# Patient Record
Sex: FEMALE | Race: WHITE | NOT HISPANIC OR LATINO | Employment: OTHER | URBAN - METROPOLITAN AREA
[De-identification: names, ages, dates, MRNs, and addresses within clinical notes are randomized per-mention and may not be internally consistent; named-entity substitution may affect disease eponyms.]

---

## 2024-03-04 DIAGNOSIS — M51.36 DDD (DEGENERATIVE DISC DISEASE), LUMBAR: Primary | ICD-10-CM

## 2024-03-15 ENCOUNTER — OFFICE VISIT (OUTPATIENT)
Dept: NEUROSURGERY | Facility: CLINIC | Age: 64
End: 2024-03-15
Payer: MEDICARE

## 2024-03-15 VITALS — BODY MASS INDEX: 21.34 KG/M2 | TEMPERATURE: 97.8 F | HEIGHT: 61 IN | WEIGHT: 113 LBS

## 2024-03-15 DIAGNOSIS — M79.604 RIGHT LEG PAIN: Primary | ICD-10-CM

## 2024-03-15 RX ORDER — LORATADINE 10 MG/1
10 TABLET ORAL DAILY
COMMUNITY

## 2024-03-15 NOTE — PROGRESS NOTES
Patient: Yecenia Campos  : 1960    Primary Care Provider: Jayleen Boykin DO    Requesting Provider: As above      Chief Complaint: Back Pain (BILATERAL), Leg Pain (RLE>LLE, BOTTOM OF RIGHT FOOT), and Numbness (LLE THIGH - CHRONIC SINCE PREVIOUS LUMBAR SURGERY)      History of Present Illness: This is a 63 y.o. female who has previously undergone 2 lumbar discectomies.  The last of which was in  where she had a far lateral discectomy at L4-5 by Dr. Thorpe.  She has done well from that surgery, but did have some residual numbness and tingling in the left leg for which she is managed conservatively.  The patient had 2 subsequent falls 1 month ago.  After the first fall, she had significant pain radiating down the left leg.  She described it as tingling and burning in nature.  It was on the lateral aspect of her leg.  This has slowly improved over time.  She had a second fall a few days later and that resulted in pain of the right leg which was similar in nature.  She states the right leg pain has slightly improved, but is still present.  It is exacerbated by standing and walking.  It tends to radiate down the lateral aspect of her right leg.  She has not had any physical therapy or injections in her back recently.    PMHX  Allergies:  Allergies   Allergen Reactions    Codeine     Daliresp [Roflumilast] Other (See Comments)     OTHER      Dust Mite Extract     Isoflavones (Soy)     Molds & Smuts     Sulfa Antibiotics     Tree Extract      Medications    Current Outpatient Medications:     ADVAIR DISKUS 500-50 MCG/DOSE DISKUS, , Disp: , Rfl:     estradiol (CLIMARA) 0.1 MG/24HR patch, , Disp: , Rfl:     loratadine (Claritin) 10 MG tablet, Take 1 tablet by mouth Daily., Disp: , Rfl:     montelukast (SINGULAIR) 10 MG tablet, , Disp: , Rfl:     omeprazole (PriLOSEC) 40 MG capsule, , Disp: , Rfl:     PROAIR  (90 BASE) MCG/ACT inhaler, , Disp: , Rfl:   Past Medical History:  Past Medical History:    Diagnosis Date    Asthma     COPD (chronic obstructive pulmonary disease)     Decreased lung capacity     Pneumonia      Past Surgical History:  Past Surgical History:   Procedure Laterality Date    BACK SURGERY      BREAST SURGERY      CHOLECYSTECTOMY      HAND SURGERY      HYSTERECTOMY      KNEE ARTHROSCOPY       Social Hx:  Social History     Tobacco Use    Smoking status: Former    Smokeless tobacco: Never   Substance Use Topics    Alcohol use: No    Drug use: No     Family Hx:  Family History   Problem Relation Age of Onset    Diabetes Other     Cancer Other     Heart disease Other      Review of Systems:        Review of Systems   Constitutional:  Negative for activity change, appetite change, chills, diaphoresis, fatigue, fever and unexpected weight change.   HENT:  Negative for congestion, dental problem, drooling, ear discharge, ear pain, facial swelling, hearing loss, mouth sores, nosebleeds, postnasal drip, rhinorrhea, sinus pressure, sinus pain, sneezing, sore throat, tinnitus, trouble swallowing and voice change.    Eyes:  Negative for photophobia, pain, discharge, redness, itching and visual disturbance.   Respiratory:  Negative for apnea, cough, choking, chest tightness, shortness of breath, wheezing and stridor.    Cardiovascular:  Negative for chest pain, palpitations and leg swelling.   Gastrointestinal:  Negative for abdominal distention, abdominal pain, anal bleeding, blood in stool, constipation, diarrhea, nausea, rectal pain and vomiting.   Endocrine: Negative for cold intolerance, heat intolerance, polydipsia, polyphagia and polyuria.   Genitourinary:  Negative for decreased urine volume, difficulty urinating, dysuria, enuresis, flank pain, frequency, genital sores, hematuria and urgency.   Musculoskeletal:  Positive for back pain and neck pain. Negative for arthralgias, gait problem, joint swelling, myalgias and neck stiffness.   Skin:  Negative for color change, pallor, rash and wound.  "  Allergic/Immunologic: Positive for environmental allergies. Negative for food allergies and immunocompromised state.   Neurological:  Negative for dizziness, tremors, seizures, syncope, facial asymmetry, speech difficulty, weakness, light-headedness, numbness and headaches.   Hematological:  Negative for adenopathy. Does not bruise/bleed easily.   Psychiatric/Behavioral:  Negative for agitation, behavioral problems, confusion, decreased concentration, dysphoric mood, hallucinations, self-injury, sleep disturbance and suicidal ideas. The patient is not nervous/anxious and is not hyperactive.    All other systems reviewed and are negative.       Physical Exam:   Temp 97.8 °F (36.6 °C) (Infrared)   Ht 154.9 cm (61\")   Wt 51.3 kg (113 lb)   BMI 21.35 kg/m²   Awake, alert and oriented x 3  Speech f/c  Opens eyes spont  Pupils 3 mm rx bilaterally  Extraocular muscles intact bilaterally  Normal sensation to light touch in all 3 distributions of CN V bilaterally  Face symmetric bilaterally  Tongue midline  5/5 in the lower extremities bilaterally    Diagnostic Studies:  All neurological imaging studies were independently reviewed unless stated otherwise    Assessment/Plan:  This is a 63 y.o. female presenting with right leg pain after a fall 1 month ago.  In reviewing the patient's lumbar MRI, she has degenerative disc disease at multiple levels but there is no significant central or neuroforaminal stenosis.  I do not appreciate any obvious compression of the nerve roots at any level.  Clinically, the patient has shown improvement in her left leg pain, but is still dealing with some right leg pain.  I offered to refer the patient to physical therapy, but she states she does not want to go.  She is requesting a referral to pain management which I think is reasonable.  We will refer to Dr. Guzman for discussion of an epidural injection.  Due to the overall benign nature of her imaging, I do not think there is a role for " surgical intervention.  I will defer to pain management for further evaluation, but will be available for any further questions.    Diagnoses and all orders for this visit:    1. Right leg pain (Primary)      Yeceina Campos  reports that she has quit smoking. She has never used smokeless tobacco.       Zuhair Pettit MD  03/15/24  15:30 EDT

## 2024-06-26 NOTE — PROGRESS NOTES
"Chief Complaint: \"Lower back pain\"           History of Present Illness:   Patient: Ms. Yecenia Campos, 63 y.o. female   Referring Physician: Dr. Zuhair Pettit  Reason for Referral: Consultation for chronic intractable lower back and bilateral lower extremity pain.   Pain History: Patient reports a several year history of chronic intractable lower back pain, which began without incident. She is a former patient, last seen in 2016 at the request of Dr. Alexis Thorpe in consultation for chronic intractable mechanical lower back pain.  At that time, she presented with a 6-year history of intractable lower back pain. She had a history of 2 previous lumbar surgeries. Her first surgery in 2009 by Dr. Alvares (lumbar discectomy).  The second surgery in February 2015 with Dr. Thorpe for emergent left L4-L5 lumbar discectomy. Patient reports that she did well after her last surgery. When she presented in 2016, she reported axial mechanical lower back pain as well as symptoms consistent with left meralgia paresthetica that correlated with findings of an EMG/NCV.  She underwent a first set of diagnostic bilateral lumbar medial branch blocks on 02/15/2016, a second set on 02/22/2016, and on 03/09/2016, she underwent bilateral lumbar medial branch rhizotomies at L3, L4, L5; for bilateral lumbar facet joints at L4-L5 and L5-S1 from which she experienced almost complete and functional improvement lasting for more than 3 years. The patient reports that she suffered 2 falls a few months ago. After the first fall, she had significant pain radiating down the left leg. She described it as tingling and burning in nature. It was on the lateral aspect of her leg.  This has slowly improved over time. She had a second fall a few days later and that resulted in pain of the right leg which was similar in nature. She states the right leg pain has slightly improved, but is still present.  It is exacerbated by standing and walking.  It tends " to radiate down the lateral aspect of her right leg. The majority of her pain is in her lower back and intermittently in her LLE. She has tried physical therapy in the past without relief. Yecenia Campos underwent neurosurgical consultation with Dr. Lima on 03/15/2024, and was found not to be a surgical candidate. MRI of the lumbar spine without contrast on 02/28/2024 revealed diffuse lumbar spondylosis.  Lumbar facet hypertrophy. L4-L5: Disc bulge, facet hypertrophy, ligamentum flavum hypertrophy.  Possible right-sided medially projecting lumbar facet joint synovial cyst. Mild central canal stenosis. Moderate to severe right greater than left lateral recess stenosis.  Mild bilateral foraminal stenosis. At L5-S1: Disc bulge, facet hypertrophy with increased fluid signal in the facet joints.  Ligamentum flavum hypertrophy.  No significant canal or foraminal stenosis. EMG NCV on 12/22/2015 by Dr. Khoa Luciano revealed normal study of both upper extremities.  Normal EMG of the left leg and back. NCS shows left lateral femoral cutaneous neuropathy. Yecenia Campos has failed to obtain pain relief with conservative measures for more than 12 months including oral analgesics, topical analgesics, ice, heat, TENs, physical therapy (in the past), independent exercise program (ongoing), to name a few. Pain has progressed in intensity over the past months.  Pain Description: Constant lower back pain with intermittent exacerbation, described as aching, dull, sharp, throbbing, and burning sensation.   Radiation of Pain: The pain radiates into the anterior aspect of her left thigh  Pain intensity today: 3/10   Average pain intensity last week: 5/10  Pain intensity ranges from: 3/10 to 7/10  Aggravating factors: Pain increases with arching her back, twisting, standing, walking. Patient describes neurogenic claudication. Patient does not use a cane or walker   Alleviating factors: Pain decreases with sitting down, sitting on a  recliner, lying down  Associated Symptoms:   Patient reports pain, numbness, and weakness in the left lower extremity. Patient denies symptoms in the opposite limb  Patient denies any new bladder or bowel problems.   Patient reports difficulties with her balance but denies recent falls.   Pain interferes with ADLs, general activities (ability to walk, stand, transition from different positions), and affects patient's quality of life  Pain interferes with sleep causing sleep fragmentation   Muscle spasms: No  Stiffness: LB    Review of previous therapies and additional medical records:  Yecenia Campos has already failed the following measures, including:   Conservative Measures: Oral analgesics, topical analgesics, ice, heat, TENs, physical therapy (in the past), independent exercise program (ongoing)  Interventional Measures:   Dr. Webb: ANGELIKA and LES 2022 (data deficient)   03/09/2016: Bilateral lumbar medial branch rhizotomies at L3, L4, L5; for bilateral lumbar facet joints at L4-L5 and L5-S1 = almost 100% pain relief and functional improvement lasting for 3 years   02/22/2016: Second set of diagnostic bilateral lumbar medial branch blocks at L3, L4, L5; for bilateral lumbar facet joints at L4-L5 and L5-S1   02/15/2016: First set of diagnostic bilateral lumbar medial branch blocks at L3, L4, L5; for bilateral lumbar facet joints at L4-L5 and L5-S1   Surgical Measures:   2009: Lumbar discectomy by by Dr. Alvares.    2015: Emergent left L4-L5 lumbar discectomy by Dr. Thorpe   Yecenia Campos underwent neurosurgical consultation with Dr. Lima on 03/15/2024, and was found not to be a surgical candidate.  Yecenia Campos presents with significant comorbidities including COPD, Hx PE   In terms of current analgesics, Yecenia Campos takes: Tylenol   I have reviewed Scott Report consistent with medication reconciliation.  SOAPP/ORT: Not completed by the patient      PHQ-2 Depression Screening  Little interest or pleasure  in doing things? 0-->not at all   Feeling down, depressed, or hopeless? 0-->not at all   PHQ-2 Total Score 0     Pain Self-Efficacy Questionnaire (PSEQ)  ITEM 06-11 2024        I can enjoy things despite the pain. 5        I can do most of the household chores (tidying up, washing dishes, etc), despite the pain. 5        I can socialize with my friends or family members as often as I used to do, despite the pain. 5        I can cope with my pain in most situations. 5        I can do some form of work, despite the pain (includes housework, paid, and unpaid work). 5        I can still do many of the things I enjoy doing, such as hobbies or leisure activity despite pain. 5        I can cope with my pain without medications. 5        I can accomplish most of my goals in life despite the pain. 5        I can live in a normal lifestyle, despite the pain. 4        I can gradually become more active, despite the pain. 5        TOTAL SCORE 49/60            Global Pain Scale 06-11 2024          Pain 9          Feelings 0          Clinical outcomes 2          Activities 0          GPS Total: 11            The [place] Back Pain Disability Scale   DATE 06-11 2024          Sleep through the night 3          Turn over in bed 3          Get out of bed 3          Make your bed 3          Put on socks (pantyhose) 3          Ride in a car 2          Sit in a chair for several hours 3          Stand up for 20-30 minutes 4          Climb one flight of stairs 4          Walk a few blocks (200-300 yards)  5          Walk several miles 5          Run one block (about 50 yards) 5          Take food out of the refrigerator 0          Reach up to high shelves 1          Move a chair 1          Pull or push heavy doors 1          Bend over to clean the bathtub 3          Throw a ball 3          Carry two bags of groceries 2          Lift and carry a heavy suitcase 5          Total score 57              Review of Diagnostic Studies: I have  independently reviewed and interpreted the images with the patient and used the images and a tridimensional spine model to explain findings. I have also reviewed the reports.  MRI of the lumbar spine without contrast on 02/28/2024 revealed diffuse lumbar spondylosis.  Vertebral body heights are preserved.  Minimal anterolisthesis of 2 mm of L4 on L5.  The conus is seen at L1 and has an unremarkable appearance.  Axial imaging:  L1-L2: No significant canal or foraminal stenosis  L2-L3: Disc bulge. Facet hypertrophy. No significant canal or foraminal stenosis  L3-L4: Disc bulge. Facet hypertrophy. Mild bilateral foraminal stenosis  L4-L5: Disc bulge, facet hypertrophy, ligamentum flavum hypertrophy.  Possible right-sided medially projecting lumbar facet joint synovial cyst..  Mild central canal stenosis.  Moderate to severe right greater than left lateral recess stenosis.  Mild bilateral foraminal stenosis  L5-S1: Disc bulge, facet hypertrophy with increased fluid signal in the facet joints.  Ligamentum flavum hypertrophy.  No significant canal or foraminal stenosis  EMG NCV on 12/22/2015 by Dr. Khoa Luciano revealed normal study of both upper extremities.  Normal EMG of the left leg and back.  NCS shows left lateral femoral cutaneous neuropathy    Review of Systems      Patient Active Problem List   Diagnosis    Spondylosis of lumbar region without myelopathy or radiculopathy    Lumbar postlaminectomy syndrome    Degeneration of lumbar or lumbosacral intervertebral disc    Lumbar stenosis with neurogenic claudication    Physical deconditioning       Past Medical History:   Diagnosis Date    Asthma     COPD (chronic obstructive pulmonary disease)     Decreased lung capacity     Pneumonia          Past Surgical History:   Procedure Laterality Date    BACK SURGERY      BREAST SURGERY      CHOLECYSTECTOMY      HAND SURGERY      HYSTERECTOMY      KNEE ARTHROSCOPY           Family History   Problem Relation Age of Onset     "Diabetes Other     Cancer Other     Heart disease Other          Social History     Socioeconomic History    Marital status:    Tobacco Use    Smoking status: Former    Smokeless tobacco: Never   Vaping Use    Vaping status: Never Used   Substance and Sexual Activity    Alcohol use: No    Drug use: No    Sexual activity: Defer           Current Outpatient Medications:     ADVAIR DISKUS 500-50 MCG/DOSE DISKUS, , Disp: , Rfl:     estradiol (CLIMARA) 0.1 MG/24HR patch, , Disp: , Rfl:     glucose blood test strip, Daily., Disp: , Rfl:     loratadine (Claritin) 10 MG tablet, Take 1 tablet by mouth Daily., Disp: , Rfl:     omeprazole (PriLOSEC) 40 MG capsule, , Disp: , Rfl:     tiotropium bromide monohydrate (Spiriva Respimat) 2.5 MCG/ACT aerosol solution inhaler, Inhale 1 inhalation every day by inhalation route for 90 days., Disp: , Rfl:     montelukast (SINGULAIR) 10 MG tablet, , Disp: , Rfl:     PROAIR  (90 BASE) MCG/ACT inhaler, , Disp: , Rfl:     rivaroxaban (XARELTO) 20 MG tablet, Take 1 tablet by mouth Daily., Disp: , Rfl:       Allergies   Allergen Reactions    Codeine     Daliresp [Roflumilast] Other (See Comments)     OTHER      Dust Mite Extract     Isoflavones (Soy)     Molds & Smuts     Sulfa Antibiotics     Tree Extract          Ht 154.9 cm (60.98\")   Wt 48.5 kg (107 lb)   BMI 20.23 kg/m²       Physical Exam:  Constitutional: Patient appears well-developed, well-nourished, well-hydrated, appears younger than stated age  HEENT: Head: Normocephalic and atraumatic  Eyes: Conjunctivae and lids are normal  Pupils: Equal, round, reactive to light  Peripheral vascular exam: Posterior tibialis: right 2+ and left 2+. Dorsalis pedis: right 2+ and left 2+. No edema.   Musculoskeletal   Gait and station: Gait evaluation demonstrated a normal gait. Able to walk on heels, toes, tandem walking   Lumbar Spine: Passive and active range of motion are limited secondary to pain. Extension, lateral flexion, " rotation of the lumbar spine increased and reproduced pain. Lumbar facet joint loading maneuvers are positive.  Sacroiliac Joints: Dhruv's test; Gaenslen's test; thigh thrust test; SI compression test; posterior shear test; SI distraction test; pelvic rock test; Yeoman's test: Negative   Piriformis maneuvers: Negative   Right Hip Joint: The range of motion of the hip joint is limited to flexion and internal rotation but without pain   Left Hip Joint: The range of motion of the hip joint is limited to flexion and internal rotation but without pain   Palpation of the bilateral psoas tendons and iliopsoas bursas: Unrevealing   Palpation of the bilateral greater trochanters: Unrevealing   Examination of the Iliotibial band: Unrevealing   Neurological:   Patient is alert and oriented to person, place, and time.   Speech: Normal.   Cortical function: Normal mental status.   Reflex Scores:  Right patellar: 2+  Left patellar: 1+  Right Achilles: 1+  Left Achilles: 1+  Motor strength: 5/5  Motor Tone: Normal  Involuntary movements: None.   Superficial/Primitive Reflexes: Primitive reflexes were absent.   Right Casillas: Absent  Left Casillas: Absent  Right ankle clonus: Absent  Left ankle clonus: Absent   Babinsky: Absent  Long tract signs: Negative. Straight leg raising test: Negative. Femoral stretch sign: Negative.   Sensory exam: Intact to light touch, intact pain and temperature sensation, intact vibration sensation and normal proprioception  Balance: Normal Romberg's sign: Negative   Skin and subcutaneous tissue: Skin is warm and intact. No rash noted. No cyanosis.   Psychiatric: Judgment and insight: Normal. Recent and remote memory: Intact. Mood and affect: Normal.       ASSESSMENT:   1. Spondylosis of lumbar region without myelopathy or radiculopathy    2. Degeneration of lumbar or lumbosacral intervertebral disc    3. Lumbar postlaminectomy syndrome    4. Lumbar stenosis with neurogenic claudication    5. Physical  deconditioning        PLAN/MEDICAL DECISION MAKING:  Ms. Yecenia Campos, 63 y.o. female presents with a several year history of chronic intractable lower back pain.She has a history of 2 previous lumbar surgeries. Her first surgery in 2009 by Dr. Alvares (lumbar discectomy). The second surgery in February 2015 with Dr. Thorpe for emergent left L4-L5 lumbar discectomy. Patient reports that she did well after her last surgery. She originally presented in 2016 with axial mechanical lower back pain as well as symptoms of left meralgia paresthetica supported by EMG/NCV.  She underwent a first set of diagnostic bilateral lumbar medial branch blocks on 02/15/2016, a second set on 02/22/2016, and on 03/09/2016, she underwent bilateral lumbar medial branch rhizotomies at L3, L4, L5; for bilateral lumbar facet joints at L4-L5 and L5-S1 from which she experienced almost complete and functional improvement lasting for more than 3 years. The patient reports that she suffered 2 falls a few months ago. After the first fall, she had significant pain radiating down the left leg, then affected her right leg, and now, her pain is affecting just her lower back. She tried physical therapy in the past without relief. Yecenia Campos underwent neurosurgical consultation with Dr. Lima on 03/15/2024, and was found not to be a surgical candidate. MRI of the lumbar spine without contrast on 02/28/2024 revealed diffuse lumbar spondylosis.  Lumbar facet hypertrophy. L4-L5: Disc bulge, facet hypertrophy, ligamentum flavum hypertrophy.  Possible right-sided medially projecting lumbar facet joint synovial cyst. Mild central canal stenosis. Moderate to severe right greater than left lateral recess stenosis.  Mild bilateral foraminal stenosis. At L5-S1: Disc bulge, facet hypertrophy with increased fluid signal in the facet joints.  Ligamentum flavum hypertrophy.  No significant canal or foraminal stenosis. EMG NCV on 12/22/2015 by Dr. Khoa Luciano  revealed normal study of both upper extremities.  Normal EMG of the left leg and back. NCS shows left lateral femoral cutaneous neuropathy. Yecenia Campos has failed to obtain pain relief with conservative measures for more than 12 months including oral analgesics, topical analgesics, ice, heat, TENs, physical therapy (in the past), independent exercise program (ongoing), to name a few. Pain has progressed in intensity over the past months. A comprehensive evaluation including history and physical exam along with pertinent physiologic and functional assessment was performed. Patient presents with intractable pain due to the diagnoses listed above. Patient has failed to respond to conservative modalities and previous surgical interventions, as referenced under HPI. Patient has responded well to minimally invasive interventional pain management measures, as referenced from previous notes and under HPI. I have documented the impact of patient's moderate-to-severe pain contributing to significant impairment in daily activities, ADLs, and a negative impact on the patient's quality of life, as reflected on Global Pain Scale 11/100; The [place] Back Pain Disability Scale 57/100; Tinetti Gait & Balance Assessment Tool  (low risk for falls). I have reviewed pertinent supporting diagnostic studies of patient's chronic pain condition as well as all available pertinent medical records to patient's chronic pain condition including previous therapies, as referenced above. PHQ-2 Depression Screening 0; Pain Self-Efficacy Questionnaire (PSEQ) 49/60. I had a lengthy conversation with Ms. Yecenia Campos regarding her chronic pain condition and potential therapeutic options including risks, benefits, alternative therapies, to name a few. We have discussed using a stepwise approach starting with the least intense level of care as determined by the extent required to diagnose and or treat a patient's condition. The proposed treatments  are consistent with the patient's medical condition and known to be safe and effective by current guidelines and the standard of care. The duration and frequency proposed are considered appropriate for the service in accordance with accepted standards of medical practice for the diagnosis and treatment of the patient's condition and intended to improve the patient's level of function. These services will be furnished in a setting appropriate to the patient's medical needs and condition. Therefore, I have proposed the following plan:    1. Interventional pain management measures: Patient does not take blood thinners. Patient presents with chronic recurrent axial mechanical lumbar spine facetogenic pain without evidence of underlying or untreated radiculopathy and that causes functional deficit measured on pain scales as well as functional and disability scales. Pain has been present for > 10 years. Yecenia Campos average pain level for the past months has been rated as 5/10 ranging from 3/10 to 7/10. Patient failed to obtain pain relief or functional improvement from conservative measures, as referenced under HPI. On 03/09/2016, she underwent bilateral lumbar medial branch rhizotomies at L3, L4, L5; for bilateral lumbar facet joints at L4-L5 and L5-S1 from which she experienced almost complete and functional improvement lasting for more than 3 years. Pain assessment and disability scales have been obtained at baseline and will be used for functional assessment. Diagnostic interventions will be performed without sedation or with the use of light sedation (but without the use of opioids) depending on patient's specific medical requirements. For radiofrequency procedures; we may proceed without sedation or with various degrees of sedation according to patient's specific requirements. All bilateral procedures will be done in one encounter. We have discussed prospects of one set of diagnostic bilateral lumbar medial  branch blocks at L3, L4, L5; for bilateral lumbar facet joints at L4-L5, and L5-S1 to clarify the origin of chronic refractory mechanical lower back pain. If the patient experiences 80% or more relief along with significant functional improvement and increase in the range of motion of the lumbar spine, then, patient will be scheduled for repeat bilateral lumbar medial branch rhizotomies. If patient experiences 50% or more pain relief and functional improvement for at least six months, we could repeat the procedure. If more than 2 years elapse since last RFTC, then, patient will be required to undergo diagnostic blocks prior to repeating RFTC.   Other options: lumbar epidural steroid injections, MILD, Minuteman, Vertiflex, a spinal cord stimulator trial     2. Diagnostic studies:   A. Lumbar spine full views with flexion and extension X-rays to assess lumbar stability  B. EMG/NCV of the bilateral lower extremities     3. Pharmacological measures: Reviewed and discussed; Patient takes Tylenol.     4. Long-term rehabilitation efforts:  A. The patient does not have a history of falls. Also, I performed a risk assessment for falls using the Tinetti gait & balance assessment tool (scored low risk for falls).   B. Patient will start a comprehensive physical therapy program at [facility], gait and balance training, neurodynamics, core strengthening, gluteal and abductor strengthening, ultrasound, ASTYM, E-STIM, myofascial release, cupping, dry needling, home exercise program, 2-3 x per week for 8 weeks  C. Contrast therapy: Apply ice-packs for 15-20 minutes, followed by heating pads for 15-20 minutes to affected area   D. Start a low impact exercise program such as water therapy, swimming, yoga  E. Yecenia Andres  reports that she has quit smoking. She has never used smokeless tobacco.     5. The patient has been instructed to contact my office with any questions or difficulties. The patient  understands the plan and agrees to proceed accordingly.    The patient has a documented plan of care to address chronic pain. Yecenia Campos reports a pain score of 3/10.  Given her pain assessment as noted, treatment options were discussed and the following options were decided upon as a follow-up plan to address the patient's pain: continuation of current treatment plan for pain, educational materials on pain management, home exercises and therapy, patient declined analgesics, prescription for non-opiod analgesics, referral to Physical Therapy, referral to specialist for assistance in pain treatment guidance, steroid injections, use of non-medical modalities (ice, heat, stretching and/or behavior modifications), and interventional pain management measures including neuromodulation techniques .               Pain Management Panel           No data to display                 NANY query complete. NANY reviewed by Joseph Sands MD.          No orders of the defined types were placed in this encounter.     Please note that portions of this note were completed with a voice recognition program.   Any copied data in any portion of my note has been reviewed by myself and accurate.     The 21st Century Cures Act makes medical notes like this available to patients in the interest of transparency. This is a medical document intended as peer to peer communication. It is written in medical language and may contain abbreviations or verbiage that are unfamiliar. It may appear blunt or direct. Medical documents are intended to carry relevant information, facts as evident, and the clinical opinion of the practitioner.     Joseph Sands MD    Patient Care Team:  Jayleen Boykin DO as PCP - General (Family Medicine)  Zuhair Pettit MD as Consulting Physician (Neurosurgery)  Jayleen Boykin DO as Primary Care Provider (Family Medicine)  Jayleen Boykin DO as Referring Physician (Family Medicine)      No orders of the defined types were placed in this encounter.        No future appointments.

## 2024-07-02 ENCOUNTER — OFFICE VISIT (OUTPATIENT)
Dept: PAIN MEDICINE | Facility: CLINIC | Age: 64
End: 2024-07-02
Payer: MEDICARE

## 2024-07-02 VITALS — BODY MASS INDEX: 20.2 KG/M2 | HEIGHT: 61 IN | WEIGHT: 107 LBS

## 2024-07-02 DIAGNOSIS — M47.816 SPONDYLOSIS OF LUMBAR REGION WITHOUT MYELOPATHY OR RADICULOPATHY: ICD-10-CM

## 2024-07-02 DIAGNOSIS — R53.81 PHYSICAL DECONDITIONING: ICD-10-CM

## 2024-07-02 DIAGNOSIS — M48.062 LUMBAR STENOSIS WITH NEUROGENIC CLAUDICATION: ICD-10-CM

## 2024-07-02 DIAGNOSIS — M51.37 DEGENERATION OF LUMBAR OR LUMBOSACRAL INTERVERTEBRAL DISC: ICD-10-CM

## 2024-07-02 DIAGNOSIS — M96.1 LUMBAR POSTLAMINECTOMY SYNDROME: ICD-10-CM

## 2024-07-02 PROBLEM — M51.379 DEGENERATION OF LUMBAR OR LUMBOSACRAL INTERVERTEBRAL DISC: Status: ACTIVE | Noted: 2024-07-02

## 2024-07-02 PROCEDURE — 99204 OFFICE O/P NEW MOD 45 MIN: CPT | Performed by: ANESTHESIOLOGY

## 2024-07-02 PROCEDURE — 1159F MED LIST DOCD IN RCRD: CPT | Performed by: ANESTHESIOLOGY

## 2024-07-02 PROCEDURE — 1160F RVW MEDS BY RX/DR IN RCRD: CPT | Performed by: ANESTHESIOLOGY

## 2024-07-02 PROCEDURE — 1125F AMNT PAIN NOTED PAIN PRSNT: CPT | Performed by: ANESTHESIOLOGY

## 2024-07-02 RX ORDER — TIOTROPIUM BROMIDE INHALATION SPRAY 3.12 UG/1
SPRAY, METERED RESPIRATORY (INHALATION)
COMMUNITY
Start: 2024-02-27

## 2024-07-05 DIAGNOSIS — M47.816 SPONDYLOSIS OF LUMBAR REGION WITHOUT MYELOPATHY OR RADICULOPATHY: ICD-10-CM

## 2024-07-05 DIAGNOSIS — M48.062 LUMBAR STENOSIS WITH NEUROGENIC CLAUDICATION: Primary | ICD-10-CM

## 2024-11-12 ENCOUNTER — HOSPITAL ENCOUNTER (OUTPATIENT)
Facility: HOSPITAL | Age: 64
Discharge: HOME | End: 2024-11-12
Admitting: ANESTHESIOLOGY
Payer: MEDICARE

## 2024-11-12 DIAGNOSIS — M47.816 SPONDYLOSIS OF LUMBAR REGION WITHOUT MYELOPATHY OR RADICULOPATHY: ICD-10-CM

## 2024-11-12 PROCEDURE — 72114 X-RAY EXAM L-S SPINE BENDING: CPT

## 2024-11-14 ENCOUNTER — TELEPHONE (OUTPATIENT)
Dept: PAIN MEDICINE | Facility: CLINIC | Age: 64
End: 2024-11-14
Payer: MEDICARE

## 2024-11-14 NOTE — TELEPHONE ENCOUNTER
Caller: RICHARD   Relationship to Patient: SELF  Phone Number: [phone] (home) [phone] (work)    Reason for Call: PATIENT STATING THAT SHE IS NOW OFF HER BLOOD THINNER AND SHE IS READY TO HAVE ANOTHER INJECTION, ASKING FOR A MONDAY OR TUESDAY

## 2024-11-18 DIAGNOSIS — M47.816 SPONDYLOSIS OF LUMBAR REGION WITHOUT MYELOPATHY OR RADICULOPATHY: Primary | ICD-10-CM

## 2024-11-18 NOTE — TELEPHONE ENCOUNTER
Patient called back and stated she is off her blood thinners since August and is wanting to schedule. Patient is now scheduled with a procedure with  on 12/16/24.    Patient had no further questions.

## 2024-12-16 ENCOUNTER — TELEPHONE (OUTPATIENT)
Dept: PAIN MEDICINE | Facility: CLINIC | Age: 64
End: 2024-12-16

## 2024-12-16 NOTE — TELEPHONE ENCOUNTER
Provider: JESS    Caller: Yecenia Campos    Relationship to Patient: Self    Pharmacy:     Phone Number: [phone]    Reason for Call: PT CALLED AND STATED THAT SHE NEEDS TO R/S HER PROCEDURE SINCE SHE HAD TESTED POS FOR COVID LAST WEEK - WOULD LIKE TO SPEAK TO JEANE

## 2024-12-17 NOTE — TELEPHONE ENCOUNTER
Called patient back and she stated she tested positive 11 days ago and is symptom free. Patient has been rescheduled for 12/30/24 with Dr. Sands. Patient had no further questions or concerns

## 2024-12-30 ENCOUNTER — OUTSIDE FACILITY SERVICE (OUTPATIENT)
Dept: PAIN MEDICINE | Facility: CLINIC | Age: 64
End: 2024-12-30
Payer: MEDICARE

## 2025-01-02 ENCOUNTER — TELEPHONE (OUTPATIENT)
Dept: PAIN MEDICINE | Facility: CLINIC | Age: 65
End: 2025-01-02
Payer: MEDICARE

## 2025-01-02 NOTE — TELEPHONE ENCOUNTER
FOLLOW-UP CALL AFTER PROCEDURE  I spoke with Yecenia Campos regarding how they're feeling after their procedure with Dr. Sands. Patient reports that she is doing well.   Yecenia Campos underwent a diagnostic procedure (see procedure note for details) on 12/30/24 . Patient reported 100% pain relief at the time of after procedure exam with Dr. Sands. In addition, patient experienced significant functional improvement (performing activities without experiencing pain in comparison with examination prior to the procedure that reproduced pain with those activities).   Pain level before procedure: 8/10   Pain level after procedure: 0/10  Patient reports that the pain relief lasted for a few days. Today, Yecenia Campos reports 60 % ongoing pain relief pain (pain level 4/10)   Patient denies side effects or complications  Patient does not have any questions or concerns at this time    Disposition:   I provided information regarding date and time of next procedure: 1/15/25 .   Reminded that the procedure is at the [facility] by [address] and [address]; [address] [place] [address], [address]   I advised that patient must have a , and that the  must remain in the premises until after the procedure is completed and the patient is ready to be discharged.   Reminded NPO status: Nothing by mouth (eat or drink) for at least 8 hours prior to the procedure. Patient can take medications with a a sip of water.     Understanding of above information verbalized.

## 2025-01-03 DIAGNOSIS — M47.816 SPONDYLOSIS OF LUMBAR REGION WITHOUT MYELOPATHY OR RADICULOPATHY: Primary | ICD-10-CM

## 2025-01-13 ENCOUNTER — TELEPHONE (OUTPATIENT)
Dept: PAIN MEDICINE | Facility: CLINIC | Age: 65
End: 2025-01-13
Payer: MEDICARE

## 2025-01-13 NOTE — TELEPHONE ENCOUNTER
Patient called wanting to know her procedure date. I advised patient that her appointment date is 1/20/25. Patient was understanding and had no further questions

## 2025-01-30 ENCOUNTER — TELEPHONE (OUTPATIENT)
Dept: PAIN MEDICINE | Facility: CLINIC | Age: 65
End: 2025-01-30
Payer: MEDICARE

## 2025-01-30 NOTE — TELEPHONE ENCOUNTER
Caller: Yecenia Campos    Relationship to patient: Self    Best call back number: [phone]    Patient is needing: PATIENT WOULD LIKE JEANE TO CALL HER REGARDING RHIZOTOMY

## 2025-02-03 DIAGNOSIS — M47.816 SPONDYLOSIS OF LUMBAR REGION WITHOUT MYELOPATHY OR RADICULOPATHY: Primary | ICD-10-CM

## 2025-02-03 NOTE — TELEPHONE ENCOUNTER
Called patient back and she is now rescheduled for a procedure with Dr. Sands on 2.24.25. I also advised patient that I will resend pre procedure instruction and appointment card in the mail.     Patient understood and had no questions  Closing TE

## 2025-02-24 ENCOUNTER — OUTSIDE FACILITY SERVICE (OUTPATIENT)
Dept: PAIN MEDICINE | Facility: CLINIC | Age: 65
End: 2025-02-24
Payer: MEDICARE

## 2025-02-25 ENCOUNTER — TELEPHONE (OUTPATIENT)
Dept: PAIN MEDICINE | Facility: CLINIC | Age: 65
End: 2025-02-25
Payer: MEDICARE

## 2025-02-25 NOTE — TELEPHONE ENCOUNTER
I spoke with the patient regarding how she is feeling after her procedure with Dr Sands on 2/24/25. Patient reports that she is doing well.    Yecenia Campos underwent a (see procedure) on 2/24/25. Patient reported 70% relief at the time of after procedure exam with Dr Sands. In addition, patient experienced significant functional improvement (perforing activities without experiencing pain compared with examoination prior to procedure that produced these activities.) When I stand I can tell a difference.     Pain level before procedure: 7/10  Pain level after procedure: 0/10    Today, the patient reports 70% pain relief (pain level 3/10.)    Patient denies side effects or complications.  Patient does not have any questions or concerns at this time.    Advised patient of follow up with SHREYAS Poole on 7/15/25.